# Patient Record
Sex: MALE | Race: WHITE | NOT HISPANIC OR LATINO | Employment: OTHER | URBAN - METROPOLITAN AREA
[De-identification: names, ages, dates, MRNs, and addresses within clinical notes are randomized per-mention and may not be internally consistent; named-entity substitution may affect disease eponyms.]

---

## 2017-11-22 ENCOUNTER — ESTABLISHED COMPREHENSIVE EXAM (OUTPATIENT)
Dept: URBAN - METROPOLITAN AREA CLINIC 39 | Facility: CLINIC | Age: 82
End: 2017-11-22

## 2017-11-22 DIAGNOSIS — H43.813: ICD-10-CM

## 2017-11-22 DIAGNOSIS — H35.371: ICD-10-CM

## 2017-11-22 DIAGNOSIS — H40.1132: ICD-10-CM

## 2017-11-22 DIAGNOSIS — Z96.1: ICD-10-CM

## 2017-11-22 DIAGNOSIS — H26.492: ICD-10-CM

## 2017-11-22 DIAGNOSIS — H04.123: ICD-10-CM

## 2017-11-22 PROCEDURE — 2027F OPTIC NERVE HEAD EVAL DONE: CPT

## 2017-11-22 PROCEDURE — 92014 COMPRE OPH EXAM EST PT 1/>: CPT

## 2017-11-22 PROCEDURE — 92015 DETERMINE REFRACTIVE STATE: CPT

## 2017-11-22 PROCEDURE — 92133 CPTRZD OPH DX IMG PST SGM ON: CPT

## 2017-11-22 PROCEDURE — G8756 NO BP MEASURE DOC: HCPCS

## 2017-11-22 PROCEDURE — 1036F TOBACCO NON-USER: CPT

## 2017-11-22 PROCEDURE — G8427 DOCREV CUR MEDS BY ELIG CLIN: HCPCS

## 2017-11-22 PROCEDURE — 3284F IOP RED >=15% PRE-NTRV LVL: CPT

## 2017-11-22 ASSESSMENT — VISUAL ACUITY
OD_SC: J2+
OS_SC: 20/30-2
OD_SC: 20/40+2
OU_SC: 20/25-1
OU_SC: J1
OS_SC: J1

## 2017-11-22 ASSESSMENT — TONOMETRY
OS_IOP_MMHG: 05
OD_IOP_MMHG: 04

## 2018-11-27 ENCOUNTER — ESTABLISHED COMPREHENSIVE EXAM (OUTPATIENT)
Dept: URBAN - METROPOLITAN AREA CLINIC 39 | Facility: CLINIC | Age: 83
End: 2018-11-27

## 2018-11-27 DIAGNOSIS — H04.123: ICD-10-CM

## 2018-11-27 DIAGNOSIS — Z96.1: ICD-10-CM

## 2018-11-27 DIAGNOSIS — H35.371: ICD-10-CM

## 2018-11-27 DIAGNOSIS — H43.813: ICD-10-CM

## 2018-11-27 DIAGNOSIS — H40.1132: ICD-10-CM

## 2018-11-27 DIAGNOSIS — H26.492: ICD-10-CM

## 2018-11-27 PROCEDURE — G9903 PT SCRN TBCO ID AS NON USER: HCPCS

## 2018-11-27 PROCEDURE — 92015 DETERMINE REFRACTIVE STATE: CPT

## 2018-11-27 PROCEDURE — G8756 NO BP MEASURE DOC: HCPCS

## 2018-11-27 PROCEDURE — 92014 COMPRE OPH EXAM EST PT 1/>: CPT

## 2018-11-27 PROCEDURE — 3284F IOP RED >=15% PRE-NTRV LVL: CPT

## 2018-11-27 PROCEDURE — 2027F OPTIC NERVE HEAD EVAL DONE: CPT

## 2018-11-27 PROCEDURE — 1036F TOBACCO NON-USER: CPT

## 2018-11-27 PROCEDURE — 92133 CPTRZD OPH DX IMG PST SGM ON: CPT

## 2018-11-27 PROCEDURE — G8427 DOCREV CUR MEDS BY ELIG CLIN: HCPCS

## 2018-11-27 ASSESSMENT — VISUAL ACUITY
OD_SC: J5
OS_SC: J1
OS_SC: 20/40-2
OD_SC: 20/30-1

## 2018-11-27 ASSESSMENT — TONOMETRY
OS_IOP_MMHG: 09
OD_IOP_MMHG: 09

## 2019-11-27 ENCOUNTER — ESTABLISHED COMPREHENSIVE EXAM (OUTPATIENT)
Dept: URBAN - METROPOLITAN AREA CLINIC 39 | Facility: CLINIC | Age: 84
End: 2019-11-27

## 2019-11-27 DIAGNOSIS — H40.1132: ICD-10-CM

## 2019-11-27 DIAGNOSIS — H52.203: ICD-10-CM

## 2019-11-27 DIAGNOSIS — Z96.1: ICD-10-CM

## 2019-11-27 DIAGNOSIS — H04.123: ICD-10-CM

## 2019-11-27 DIAGNOSIS — H35.371: ICD-10-CM

## 2019-11-27 DIAGNOSIS — H52.03: ICD-10-CM

## 2019-11-27 DIAGNOSIS — H26.492: ICD-10-CM

## 2019-11-27 DIAGNOSIS — H52.4: ICD-10-CM

## 2019-11-27 DIAGNOSIS — H43.813: ICD-10-CM

## 2019-11-27 PROCEDURE — 92133 CPTRZD OPH DX IMG PST SGM ON: CPT

## 2019-11-27 PROCEDURE — 92015 DETERMINE REFRACTIVE STATE: CPT

## 2019-11-27 PROCEDURE — 92014 COMPRE OPH EXAM EST PT 1/>: CPT

## 2019-11-27 ASSESSMENT — VISUAL ACUITY
OS_SC: J1
OU_SC: 20/40-1
OD_SC: 20/50-2
OS_BAT: 20/50 W/ MR
OS_SC: 20/40-2
OD_SC: J1

## 2019-11-27 ASSESSMENT — TONOMETRY
OD_IOP_MMHG: 09
OS_IOP_MMHG: 09

## 2021-05-27 ENCOUNTER — ESTABLISHED COMPREHENSIVE EXAM (OUTPATIENT)
Dept: URBAN - METROPOLITAN AREA CLINIC 39 | Facility: CLINIC | Age: 86
End: 2021-05-27

## 2021-05-27 DIAGNOSIS — H18.513: ICD-10-CM

## 2021-05-27 DIAGNOSIS — H40.1132: ICD-10-CM

## 2021-05-27 DIAGNOSIS — H52.03: ICD-10-CM

## 2021-05-27 DIAGNOSIS — H04.123: ICD-10-CM

## 2021-05-27 DIAGNOSIS — H43.813: ICD-10-CM

## 2021-05-27 DIAGNOSIS — H52.223: ICD-10-CM

## 2021-05-27 DIAGNOSIS — H26.492: ICD-10-CM

## 2021-05-27 DIAGNOSIS — H35.371: ICD-10-CM

## 2021-05-27 DIAGNOSIS — H52.4: ICD-10-CM

## 2021-05-27 PROCEDURE — 92133 CPTRZD OPH DX IMG PST SGM ON: CPT

## 2021-05-27 PROCEDURE — 92015 DETERMINE REFRACTIVE STATE: CPT

## 2021-05-27 PROCEDURE — 92014 COMPRE OPH EXAM EST PT 1/>: CPT

## 2021-05-27 ASSESSMENT — KERATOMETRY
OD_K2POWER_DIOPTERS: 40.75
OS_AXISANGLE2_DEGREES: 165
OD_AXISANGLE2_DEGREES: 53
OD_K1POWER_DIOPTERS: 39.25
OS_AXISANGLE_DEGREES: 75
OS_K1POWER_DIOPTERS: 40.50
OD_AXISANGLE_DEGREES: 143
OS_K2POWER_DIOPTERS: 41.50

## 2021-05-27 ASSESSMENT — VISUAL ACUITY
OU_SC: 20/40+1
OD_SC: 20/40+1
OS_SC: 20/40+2

## 2021-05-27 ASSESSMENT — TONOMETRY
OD_IOP_MMHG: 7
OS_IOP_MMHG: 8

## 2021-09-22 NOTE — PATIENT DISCUSSION
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 Pt aware will need gls after sx for all focal points.

## 2022-05-27 ENCOUNTER — COMPREHENSIVE EXAM (OUTPATIENT)
Dept: URBAN - METROPOLITAN AREA CLINIC 39 | Facility: CLINIC | Age: 87
End: 2022-05-27

## 2022-05-27 DIAGNOSIS — H04.123: ICD-10-CM

## 2022-05-27 DIAGNOSIS — H26.492: ICD-10-CM

## 2022-05-27 DIAGNOSIS — H43.813: ICD-10-CM

## 2022-05-27 DIAGNOSIS — H52.4: ICD-10-CM

## 2022-05-27 DIAGNOSIS — H35.371: ICD-10-CM

## 2022-05-27 DIAGNOSIS — H40.1132: ICD-10-CM

## 2022-05-27 DIAGNOSIS — H52.03: ICD-10-CM

## 2022-05-27 DIAGNOSIS — H52.223: ICD-10-CM

## 2022-05-27 PROCEDURE — 92014 COMPRE OPH EXAM EST PT 1/>: CPT

## 2022-05-27 PROCEDURE — 92250 FUNDUS PHOTOGRAPHY W/I&R: CPT

## 2022-05-27 PROCEDURE — 92015 DETERMINE REFRACTIVE STATE: CPT

## 2022-05-27 ASSESSMENT — KERATOMETRY
OD_K1POWER_DIOPTERS: 39.25
OD_AXISANGLE_DEGREES: 143
OS_K2POWER_DIOPTERS: 41.50
OS_AXISANGLE_DEGREES: 75
OS_K1POWER_DIOPTERS: 40.50
OD_K2POWER_DIOPTERS: 40.75
OS_AXISANGLE2_DEGREES: 165
OD_AXISANGLE2_DEGREES: 53

## 2022-05-27 ASSESSMENT — TONOMETRY
OD_IOP_MMHG: 9
OS_IOP_MMHG: 8

## 2022-05-27 ASSESSMENT — VISUAL ACUITY
OU_SC: 20/40+2
OS_SC: J3
OD_SC: J3
OU_SC: J2
OD_SC: 20/40
OS_SC: 20/40

## 2023-05-30 ENCOUNTER — COMPREHENSIVE EXAM (OUTPATIENT)
Dept: URBAN - METROPOLITAN AREA CLINIC 39 | Facility: CLINIC | Age: 88
End: 2023-05-30

## 2023-05-30 DIAGNOSIS — H52.03: ICD-10-CM

## 2023-05-30 DIAGNOSIS — H26.492: ICD-10-CM

## 2023-05-30 DIAGNOSIS — H43.813: ICD-10-CM

## 2023-05-30 DIAGNOSIS — H52.4: ICD-10-CM

## 2023-05-30 DIAGNOSIS — H35.371: ICD-10-CM

## 2023-05-30 DIAGNOSIS — H40.1132: ICD-10-CM

## 2023-05-30 DIAGNOSIS — H04.123: ICD-10-CM

## 2023-05-30 DIAGNOSIS — H52.223: ICD-10-CM

## 2023-05-30 PROCEDURE — 92014 COMPRE OPH EXAM EST PT 1/>: CPT

## 2023-05-30 PROCEDURE — 92015 DETERMINE REFRACTIVE STATE: CPT

## 2023-05-30 ASSESSMENT — KERATOMETRY
OS_K2POWER_DIOPTERS: 41.50
OD_K1POWER_DIOPTERS: 39.25
OS_AXISANGLE2_DEGREES: 165
OD_AXISANGLE_DEGREES: 143
OS_AXISANGLE_DEGREES: 75
OD_K2POWER_DIOPTERS: 40.75
OS_K1POWER_DIOPTERS: 40.50
OD_AXISANGLE2_DEGREES: 53

## 2023-05-30 ASSESSMENT — TONOMETRY
OS_IOP_MMHG: 9
OD_IOP_MMHG: 11

## 2023-05-30 ASSESSMENT — VISUAL ACUITY
OS_SC: J2
OD_SC: 20/50-2
OU_SC: J2
OD_SC: J6
OU_SC: 20/40-2
OS_SC: 20/40-2

## 2023-11-06 NOTE — PATIENT DISCUSSION
----- Message from Melissa Martinez DO sent at 11/6/2023  9:49 AM CST -----  Please call patient and inform her she has no excess protein in her urine which is a good sign of healthy kidneys.    H&P essentially unchanged.